# Patient Record
Sex: FEMALE | Race: WHITE | NOT HISPANIC OR LATINO | URBAN - METROPOLITAN AREA
[De-identification: names, ages, dates, MRNs, and addresses within clinical notes are randomized per-mention and may not be internally consistent; named-entity substitution may affect disease eponyms.]

---

## 2023-12-12 ENCOUNTER — LAB (OUTPATIENT)
Dept: LAB | Facility: LAB | Age: 73
End: 2023-12-12
Payer: MEDICARE

## 2023-12-12 DIAGNOSIS — E78.00 PURE HYPERCHOLESTEROLEMIA, UNSPECIFIED: Primary | ICD-10-CM

## 2023-12-12 DIAGNOSIS — I25.10 ATHEROSCLEROTIC HEART DISEASE OF NATIVE CORONARY ARTERY WITHOUT ANGINA PECTORIS: ICD-10-CM

## 2023-12-12 DIAGNOSIS — I10 ESSENTIAL (PRIMARY) HYPERTENSION: ICD-10-CM

## 2023-12-12 DIAGNOSIS — R53.83 OTHER FATIGUE: ICD-10-CM

## 2023-12-12 LAB
ALBUMIN SERPL-MCNC: 4.4 G/DL (ref 3.5–5)
ALP BLD-CCNC: 107 U/L (ref 35–125)
ALT SERPL-CCNC: 10 U/L (ref 5–40)
ANION GAP SERPL CALC-SCNC: 11 MMOL/L
AST SERPL-CCNC: 18 U/L (ref 5–40)
BILIRUB SERPL-MCNC: 0.5 MG/DL (ref 0.1–1.2)
BUN SERPL-MCNC: 10 MG/DL (ref 8–25)
CALCIUM SERPL-MCNC: 9.7 MG/DL (ref 8.5–10.4)
CHLORIDE SERPL-SCNC: 101 MMOL/L (ref 97–107)
CHOLEST SERPL-MCNC: 199 MG/DL (ref 133–200)
CHOLEST/HDLC SERPL: 4.2 {RATIO}
CO2 SERPL-SCNC: 27 MMOL/L (ref 24–31)
CREAT SERPL-MCNC: 0.7 MG/DL (ref 0.4–1.6)
ERYTHROCYTE [DISTWIDTH] IN BLOOD BY AUTOMATED COUNT: 13.1 % (ref 11.5–14.5)
GFR SERPL CREATININE-BSD FRML MDRD: >90 ML/MIN/1.73M*2
GLUCOSE SERPL-MCNC: 88 MG/DL (ref 65–99)
HCT VFR BLD AUTO: 42.3 % (ref 36–46)
HDLC SERPL-MCNC: 47 MG/DL
HGB BLD-MCNC: 13.1 G/DL (ref 12–16)
LDLC SERPL CALC-MCNC: 126 MG/DL (ref 65–130)
MCH RBC QN AUTO: 27 PG (ref 26–34)
MCHC RBC AUTO-ENTMCNC: 31 G/DL (ref 32–36)
MCV RBC AUTO: 87 FL (ref 80–100)
NRBC BLD-RTO: 0 /100 WBCS (ref 0–0)
PLATELET # BLD AUTO: 274 X10*3/UL (ref 150–450)
POTASSIUM SERPL-SCNC: 4.2 MMOL/L (ref 3.4–5.1)
PROT SERPL-MCNC: 6.9 G/DL (ref 5.9–7.9)
RBC # BLD AUTO: 4.85 X10*6/UL (ref 4–5.2)
SODIUM SERPL-SCNC: 139 MMOL/L (ref 133–145)
TRIGL SERPL-MCNC: 132 MG/DL (ref 40–150)
TSH SERPL DL<=0.05 MIU/L-ACNC: 3.5 MIU/L (ref 0.27–4.2)
WBC # BLD AUTO: 5.7 X10*3/UL (ref 4.4–11.3)

## 2023-12-12 PROCEDURE — 85027 COMPLETE CBC AUTOMATED: CPT

## 2023-12-12 PROCEDURE — 36415 COLL VENOUS BLD VENIPUNCTURE: CPT

## 2023-12-12 PROCEDURE — 84443 ASSAY THYROID STIM HORMONE: CPT

## 2023-12-12 PROCEDURE — 80053 COMPREHEN METABOLIC PANEL: CPT

## 2023-12-12 PROCEDURE — 80061 LIPID PANEL: CPT

## 2024-06-16 ENCOUNTER — HOSPITAL ENCOUNTER (EMERGENCY)
Facility: HOSPITAL | Age: 74
Discharge: HOME | End: 2024-06-16
Payer: MEDICARE

## 2024-06-16 ENCOUNTER — APPOINTMENT (OUTPATIENT)
Dept: RADIOLOGY | Facility: HOSPITAL | Age: 74
End: 2024-06-16
Payer: MEDICARE

## 2024-06-16 VITALS
OXYGEN SATURATION: 100 % | TEMPERATURE: 98.4 F | WEIGHT: 180 LBS | DIASTOLIC BLOOD PRESSURE: 80 MMHG | SYSTOLIC BLOOD PRESSURE: 163 MMHG | HEIGHT: 66 IN | HEART RATE: 80 BPM | BODY MASS INDEX: 28.93 KG/M2 | RESPIRATION RATE: 18 BRPM

## 2024-06-16 DIAGNOSIS — S62.114A NONDISPLACED FRACTURE OF TRIQUETRUM (CUNEIFORM) BONE, RIGHT WRIST, INITIAL ENCOUNTER FOR CLOSED FRACTURE: Primary | ICD-10-CM

## 2024-06-16 PROCEDURE — 73110 X-RAY EXAM OF WRIST: CPT | Mod: RIGHT SIDE | Performed by: RADIOLOGY

## 2024-06-16 PROCEDURE — 72125 CT NECK SPINE W/O DYE: CPT

## 2024-06-16 PROCEDURE — 29125 APPL SHORT ARM SPLINT STATIC: CPT | Mod: RT

## 2024-06-16 PROCEDURE — 99285 EMERGENCY DEPT VISIT HI MDM: CPT | Mod: 25

## 2024-06-16 PROCEDURE — 73110 X-RAY EXAM OF WRIST: CPT | Mod: RT

## 2024-06-16 PROCEDURE — 72125 CT NECK SPINE W/O DYE: CPT | Performed by: STUDENT IN AN ORGANIZED HEALTH CARE EDUCATION/TRAINING PROGRAM

## 2024-06-16 PROCEDURE — 70450 CT HEAD/BRAIN W/O DYE: CPT

## 2024-06-16 PROCEDURE — 70450 CT HEAD/BRAIN W/O DYE: CPT | Performed by: STUDENT IN AN ORGANIZED HEALTH CARE EDUCATION/TRAINING PROGRAM

## 2024-06-16 ASSESSMENT — LIFESTYLE VARIABLES
EVER FELT BAD OR GUILTY ABOUT YOUR DRINKING: NO
HAVE PEOPLE ANNOYED YOU BY CRITICIZING YOUR DRINKING: NO
TOTAL SCORE: 0
HAVE YOU EVER FELT YOU SHOULD CUT DOWN ON YOUR DRINKING: NO
EVER HAD A DRINK FIRST THING IN THE MORNING TO STEADY YOUR NERVES TO GET RID OF A HANGOVER: NO

## 2024-06-16 ASSESSMENT — COLUMBIA-SUICIDE SEVERITY RATING SCALE - C-SSRS
1. IN THE PAST MONTH, HAVE YOU WISHED YOU WERE DEAD OR WISHED YOU COULD GO TO SLEEP AND NOT WAKE UP?: NO
2. HAVE YOU ACTUALLY HAD ANY THOUGHTS OF KILLING YOURSELF?: NO
6. HAVE YOU EVER DONE ANYTHING, STARTED TO DO ANYTHING, OR PREPARED TO DO ANYTHING TO END YOUR LIFE?: NO

## 2024-06-16 ASSESSMENT — PAIN - FUNCTIONAL ASSESSMENT: PAIN_FUNCTIONAL_ASSESSMENT: 0-10

## 2024-06-16 ASSESSMENT — PAIN SCALES - GENERAL: PAINLEVEL_OUTOF10: 3

## 2024-06-16 ASSESSMENT — PAIN DESCRIPTION - LOCATION: LOCATION: ARM

## 2024-06-16 NOTE — ED PROVIDER NOTES
HPI   Chief Complaint   Patient presents with    Fall    Wrist Pain     Tripped and fell on her patio. Right wrist injury. Pt hit her face. Has small scrapes to the left eyebrow and cheek. Scraped her glasses as well. Pt reports no LOC or BT medications.       HPI  See my MDM                  Sherice Coma Scale Score: 15                     Patient History   No past medical history on file.  No past surgical history on file.  No family history on file.  Social History     Tobacco Use    Smoking status: Not on file    Smokeless tobacco: Not on file   Substance Use Topics    Alcohol use: Not on file    Drug use: Not on file       Physical Exam   ED Triage Vitals [06/16/24 1525]   Temperature Heart Rate Respirations BP   36.9 °C (98.4 °F) 84 18 (!) 172/100      Pulse Ox Temp Source Heart Rate Source Patient Position   94 % Tympanic -- Sitting      BP Location FiO2 (%)     Left arm --       Physical Exam  CONSTITUTIONAL: Vital signs reviewed as charted, well-developed and in no distress  Eyes: Extraocular muscles are intact. Pupils equal round and reactive to light. Conjunctiva are pink.    ENT: Mucous membranes are moist. Tongue in the midline. Pharynx was without erythema or exudates, uvula midline  LUNGS: Breath sounds equal and clear to auscultation. Good air exchange, no wheezes rales or retractions, pulse oximetry is charted.  HEART: Regular rate and rhythm without murmur thrill or rub, strong tones, auscultation is normal.  ABDOMEN: Soft and nontender without guarding rebound rigidity or mass. Bowel sounds are present and normal in all quadrants. There is no palpable masses or aneurysms identified. No hepatosplenomegaly, normal abdominal exam.  Neuro: The patient is awake, alert and oriented ×3. Moving all 4 extremities and answering questions appropriately.   MUSCULOSKELETAL: Examination of the right wrist does show some tenderness atThe distal radius.  There is no obvious deformity.  Motor sensation pulses are  "intact.  PSYCH: Awake alert oriented, normal mood and affect.  Skin:  Dry, normal color, warm to the touch, no rash present.  Scratch present on the right cheek, mild ecchymosis over the right upper lateral orbit full ocular range of motion without tenderness.    ED Course & MDM   Diagnoses as of 06/16/24 1801   Nondisplaced fracture of triquetrum (cuneiform) bone, right wrist, initial encounter for closed fracture       Medical Decision Making  History obtained from: patient    Vital signs, nursing notes, current medications, past medical history, Surgical history, allergies, social history, family History were reviewed.         HPI:  Patient is a 73-year-old female present emergency room today after tripping and falling on her patio.  Did strike her head on the ground.  Has small hematoma and abrasion over the superior lateral orbit.  Abrasion on her right maxillary area.  No loss conscious.  Does not take anticoagulants.  Denies dizziness, chest pain, shortness of breath, abdominal pain extremity edema.  Nontoxic well-appearing      10 point ROS was reviewed and negative except Noted above in HPI.  DDX: as listed above          MDM Summary/considerations:  EMERGENCY DEPARTMENT COURSE and DIFFERENTIAL DIAGNOSIS/MDM:    The patient presented with a chief complaint of injury of head. The differential diagnosis associated with this patient's presentation includes contusion, fracture, sprain or strain,.  Intracranial hemorrhage    Vitals:    Vitals:    06/16/24 1525   BP: (!) 172/100   BP Location: Left arm   Patient Position: Sitting   Pulse: 84   Resp: 18   Temp: 36.9 °C (98.4 °F)   TempSrc: Tympanic   SpO2: 94%   Weight: 81.6 kg (180 lb)   Height: 1.676 m (5' 6\")       Diagnoses as of 06/16/24 1801   Nondisplaced fracture of triquetrum (cuneiform) bone, right wrist, initial encounter for closed fracture       History Limited by:    None    Independent history obtained from:    None    External records " reviewed:    None    Diagnostics interpreted by me:    Xray(s) right wrist  and CT Scan(s) head and neck    Discussions with other clinicians:    None    Chronic conditions impacting care:    None    Social determinants of health affecting care:    None    Diagnostic tests considered but not performed: none    ED Medications managed:    Medications - No data to display    Prescription drugs considered:    None    Screenings:          Labs Reviewed - No data to display  CT cervical spine wo IV contrast   Final Result   CT HEAD:   1. No evidence of hemorrhage, skull fracture, or other acute   intracranial trauma.   2. Subtle attenuation changes present in the periventricular and   subcortical white matter of bilateral cerebral hemispheres likely   represent sequela of microvascular disease.        CT C-SPINE:   1. No evidence of acute trauma to the cervical spine.   2. Mild-to-moderate spinal canal narrowing is suspected at the level   of C6-C7 due to disc osteophyte complex and ligamentum flavum   thickening.        MACRO:   None        Signed by: Faisal Logan 6/16/2024 5:13 PM   Dictation workstation:   [id number]      CT head wo IV contrast   Final Result   CT HEAD:   1. No evidence of hemorrhage, skull fracture, or other acute   intracranial trauma.   2. Subtle attenuation changes present in the periventricular and   subcortical white matter of bilateral cerebral hemispheres likely   represent sequela of microvascular disease.        CT C-SPINE:   1. No evidence of acute trauma to the cervical spine.   2. Mild-to-moderate spinal canal narrowing is suspected at the level   of C6-C7 due to disc osteophyte complex and ligamentum flavum   thickening.        MACRO:   None        Signed by: Faisal Logan 6/16/2024 5:13 PM   Dictation workstation:   [id number]      XR wrist right 3+ views   Final Result   Equivocal nondisplaced dorsal triquetral fracture on the lateral   radiograph. Correlate with  point tenderness.        Moderate 1st carpometacarpal and triscaphe osteoarthritis.        Signed by: Austni Foster 6/16/2024 4:25 PM   Dictation workstation:   [id number]        Medications - No data to display  New Prescriptions    No medications on file     I estimate there is LOW risk for COMPARTMENT SYNDROME, DEEP VENOUS THROMBOSIS, SEPTIC ARTHRITIS, TENDON OR NEUROVASCULAR INJURY, thus I consider the discharge disposition reasonable. We have discussed the diagnosis and risks, and we agree with discharging home to follow-up with their primary doctor or the referral orthopedist. We also discussed returning to the Emergency Department immediately if new or worsening symptoms occur. We have discussed the symptoms which aremost concerning (e.g., changing or worsening pain, numbness, weakness) that necessitates immediate return.    X-ray of the wrist does show a nondisplaced triquetrum fracture patient will be placed into a short arm splint.  Splint was done by nursing staff.  Will follow with orthopedics 1 to 2 days for reevaluation.  Was discharged home stable condition.  CT scan of the head and neck were negative for acute process.        I was present for splint application. Post splint application, the patient was neurologically intact, motor and sensation were intact. Cap refill is less than 3 seconds.        All of the patient's questions were answered to the best of my ability.  Patient states understanding that they have been screened for an emergency today and we have not found any etiology of symptoms that requires emergent treatment or admission to the hospital at this point. They understand that they have not had definitive care day and require follow-up for treatment of their condition. They also state understanding that they may have an emergent condition that may potentially have not of detected at this visit and they must return to the emergency department if they develop any worsening of symptoms  or new complaints.      I have evaluated this patient, my supervising physician was available for consultation.            Critical Care: Not warranted at this time        This chart was completed using voice recognition transcription software. Please excuse any errors of transcription including grammatical, punctuation, syntax and spelling errors.  Please contact me with any questions regarding this chart.    Procedure  Procedures     VIJAY Chapa-VICTORIA  06/16/24 0510

## 2024-08-28 ENCOUNTER — HOSPITAL ENCOUNTER (EMERGENCY)
Facility: HOSPITAL | Age: 74
Discharge: HOME | End: 2024-08-28
Attending: EMERGENCY MEDICINE
Payer: MEDICARE

## 2024-08-28 ENCOUNTER — LAB (OUTPATIENT)
Dept: LAB | Facility: LAB | Age: 74
End: 2024-08-28
Payer: MEDICARE

## 2024-08-28 ENCOUNTER — APPOINTMENT (OUTPATIENT)
Dept: CARDIOLOGY | Facility: HOSPITAL | Age: 74
End: 2024-08-28
Payer: MEDICARE

## 2024-08-28 ENCOUNTER — APPOINTMENT (OUTPATIENT)
Dept: RADIOLOGY | Facility: HOSPITAL | Age: 74
End: 2024-08-28
Payer: MEDICARE

## 2024-08-28 VITALS
TEMPERATURE: 97.9 F | WEIGHT: 176 LBS | SYSTOLIC BLOOD PRESSURE: 140 MMHG | BODY MASS INDEX: 29.32 KG/M2 | DIASTOLIC BLOOD PRESSURE: 68 MMHG | HEART RATE: 73 BPM | HEIGHT: 65 IN | RESPIRATION RATE: 16 BRPM | OXYGEN SATURATION: 98 %

## 2024-08-28 DIAGNOSIS — R53.83 OTHER FATIGUE: ICD-10-CM

## 2024-08-28 DIAGNOSIS — E78.00 PURE HYPERCHOLESTEROLEMIA, UNSPECIFIED: ICD-10-CM

## 2024-08-28 DIAGNOSIS — R50.9 FEVER, UNSPECIFIED FEVER CAUSE: ICD-10-CM

## 2024-08-28 DIAGNOSIS — I10 ESSENTIAL (PRIMARY) HYPERTENSION: ICD-10-CM

## 2024-08-28 DIAGNOSIS — R06.02 SHORTNESS OF BREATH: Primary | ICD-10-CM

## 2024-08-28 DIAGNOSIS — U07.1 COVID-19: ICD-10-CM

## 2024-08-28 DIAGNOSIS — I25.10 ATHEROSCLEROTIC HEART DISEASE OF NATIVE CORONARY ARTERY WITHOUT ANGINA PECTORIS: ICD-10-CM

## 2024-08-28 LAB
ALBUMIN SERPL-MCNC: 4.2 G/DL (ref 3.5–5)
ALP BLD-CCNC: 96 U/L (ref 35–125)
ALT SERPL-CCNC: 14 U/L (ref 5–40)
ANION GAP SERPL CALC-SCNC: 14 MMOL/L
ANION GAP SERPL CALC-SCNC: >19 MMOL/L
APPEARANCE UR: CLEAR
AST SERPL-CCNC: 25 U/L (ref 5–40)
BASOPHILS # BLD AUTO: 0.02 X10*3/UL (ref 0–0.1)
BASOPHILS NFR BLD AUTO: 0.5 %
BILIRUB SERPL-MCNC: 0.3 MG/DL (ref 0.1–1.2)
BILIRUB UR STRIP.AUTO-MCNC: NEGATIVE MG/DL
BUN SERPL-MCNC: 26 MG/DL (ref 8–25)
BUN SERPL-MCNC: 29 MG/DL (ref 8–25)
CALCIUM SERPL-MCNC: 9.4 MG/DL (ref 8.5–10.4)
CALCIUM SERPL-MCNC: 9.5 MG/DL (ref 8.5–10.4)
CHLORIDE SERPL-SCNC: 100 MMOL/L (ref 97–107)
CHLORIDE SERPL-SCNC: 101 MMOL/L (ref 97–107)
CO2 SERPL-SCNC: 20 MMOL/L (ref 24–31)
CO2 SERPL-SCNC: 24 MMOL/L (ref 24–31)
COLOR UR: ABNORMAL
CREAT SERPL-MCNC: 1.2 MG/DL (ref 0.4–1.6)
CREAT SERPL-MCNC: 1.2 MG/DL (ref 0.4–1.6)
EGFRCR SERPLBLD CKD-EPI 2021: 48 ML/MIN/1.73M*2
EGFRCR SERPLBLD CKD-EPI 2021: 48 ML/MIN/1.73M*2
EOSINOPHIL # BLD AUTO: 0.09 X10*3/UL (ref 0–0.4)
EOSINOPHIL NFR BLD AUTO: 2.4 %
ERYTHROCYTE [DISTWIDTH] IN BLOOD BY AUTOMATED COUNT: 13.1 % (ref 11.5–14.5)
ERYTHROCYTE [DISTWIDTH] IN BLOOD BY AUTOMATED COUNT: 13.2 % (ref 11.5–14.5)
EST. AVERAGE GLUCOSE BLD GHB EST-MCNC: 103 MG/DL
GLUCOSE SERPL-MCNC: 100 MG/DL (ref 65–99)
GLUCOSE SERPL-MCNC: 108 MG/DL (ref 65–99)
GLUCOSE UR STRIP.AUTO-MCNC: NORMAL MG/DL
HBA1C MFR BLD: 5.2 %
HCT VFR BLD AUTO: 39.9 % (ref 36–46)
HCT VFR BLD AUTO: 40.5 % (ref 36–46)
HGB BLD-MCNC: 12.8 G/DL (ref 12–16)
HGB BLD-MCNC: 13.2 G/DL (ref 12–16)
IMM GRANULOCYTES # BLD AUTO: 0.01 X10*3/UL (ref 0–0.5)
IMM GRANULOCYTES NFR BLD AUTO: 0.3 % (ref 0–0.9)
KETONES UR STRIP.AUTO-MCNC: NEGATIVE MG/DL
LACTATE BLDV-SCNC: 1.1 MMOL/L (ref 0.4–2)
LEUKOCYTE ESTERASE UR QL STRIP.AUTO: ABNORMAL
LYMPHOCYTES # BLD AUTO: 1.85 X10*3/UL (ref 0.8–3)
LYMPHOCYTES NFR BLD AUTO: 49.9 %
MCH RBC QN AUTO: 27.4 PG (ref 26–34)
MCH RBC QN AUTO: 28.1 PG (ref 26–34)
MCHC RBC AUTO-ENTMCNC: 31.6 G/DL (ref 32–36)
MCHC RBC AUTO-ENTMCNC: 33.1 G/DL (ref 32–36)
MCV RBC AUTO: 85 FL (ref 80–100)
MCV RBC AUTO: 87 FL (ref 80–100)
MONOCYTES # BLD AUTO: 0.28 X10*3/UL (ref 0.05–0.8)
MONOCYTES NFR BLD AUTO: 7.5 %
MUCOUS THREADS #/AREA URNS AUTO: NORMAL /LPF
NEUTROPHILS # BLD AUTO: 1.46 X10*3/UL (ref 1.6–5.5)
NEUTROPHILS NFR BLD AUTO: 39.4 %
NITRITE UR QL STRIP.AUTO: NEGATIVE
NRBC BLD-RTO: 0 /100 WBCS (ref 0–0)
NRBC BLD-RTO: 0 /100 WBCS (ref 0–0)
NT-PROBNP SERPL-MCNC: 56 PG/ML (ref 0–353)
PH UR STRIP.AUTO: 5 [PH]
PLATELET # BLD AUTO: 244 X10*3/UL (ref 150–450)
PLATELET # BLD AUTO: 252 X10*3/UL (ref 150–450)
POTASSIUM SERPL-SCNC: 3.5 MMOL/L (ref 3.4–5.1)
POTASSIUM SERPL-SCNC: 4 MMOL/L (ref 3.4–5.1)
PROT SERPL-MCNC: 7.6 G/DL (ref 5.9–7.9)
PROT UR STRIP.AUTO-MCNC: NEGATIVE MG/DL
RBC # BLD AUTO: 4.68 X10*6/UL (ref 4–5.2)
RBC # BLD AUTO: 4.69 X10*6/UL (ref 4–5.2)
RBC # UR STRIP.AUTO: NEGATIVE /UL
RBC #/AREA URNS AUTO: NORMAL /HPF
SARS-COV-2 RNA RESP QL NAA+PROBE: DETECTED
SODIUM SERPL-SCNC: 139 MMOL/L (ref 133–145)
SODIUM SERPL-SCNC: 140 MMOL/L (ref 133–145)
SP GR UR STRIP.AUTO: 1.01
SQUAMOUS #/AREA URNS AUTO: NORMAL /HPF
TROPONIN T SERPL-MCNC: 6 NG/L
TROPONIN T SERPL-MCNC: 8 NG/L
UROBILINOGEN UR STRIP.AUTO-MCNC: NORMAL MG/DL
WBC # BLD AUTO: 3.7 X10*3/UL (ref 4.4–11.3)
WBC # BLD AUTO: 4.2 X10*3/UL (ref 4.4–11.3)
WBC #/AREA URNS AUTO: NORMAL /HPF

## 2024-08-28 PROCEDURE — 83605 ASSAY OF LACTIC ACID: CPT | Performed by: EMERGENCY MEDICINE

## 2024-08-28 PROCEDURE — 93005 ELECTROCARDIOGRAM TRACING: CPT

## 2024-08-28 PROCEDURE — 96360 HYDRATION IV INFUSION INIT: CPT

## 2024-08-28 PROCEDURE — 2500000004 HC RX 250 GENERAL PHARMACY W/ HCPCS (ALT 636 FOR OP/ED): Performed by: EMERGENCY MEDICINE

## 2024-08-28 PROCEDURE — 83880 ASSAY OF NATRIURETIC PEPTIDE: CPT | Performed by: EMERGENCY MEDICINE

## 2024-08-28 PROCEDURE — 84484 ASSAY OF TROPONIN QUANT: CPT | Performed by: EMERGENCY MEDICINE

## 2024-08-28 PROCEDURE — 81001 URINALYSIS AUTO W/SCOPE: CPT | Performed by: EMERGENCY MEDICINE

## 2024-08-28 PROCEDURE — 99283 EMERGENCY DEPT VISIT LOW MDM: CPT | Mod: 25

## 2024-08-28 PROCEDURE — 71045 X-RAY EXAM CHEST 1 VIEW: CPT | Performed by: STUDENT IN AN ORGANIZED HEALTH CARE EDUCATION/TRAINING PROGRAM

## 2024-08-28 PROCEDURE — 87635 SARS-COV-2 COVID-19 AMP PRB: CPT | Performed by: EMERGENCY MEDICINE

## 2024-08-28 PROCEDURE — 36415 COLL VENOUS BLD VENIPUNCTURE: CPT | Performed by: EMERGENCY MEDICINE

## 2024-08-28 PROCEDURE — 84075 ASSAY ALKALINE PHOSPHATASE: CPT | Performed by: EMERGENCY MEDICINE

## 2024-08-28 PROCEDURE — 85025 COMPLETE CBC W/AUTO DIFF WBC: CPT | Performed by: EMERGENCY MEDICINE

## 2024-08-28 PROCEDURE — 87086 URINE CULTURE/COLONY COUNT: CPT | Mod: WESLAB | Performed by: EMERGENCY MEDICINE

## 2024-08-28 PROCEDURE — 71045 X-RAY EXAM CHEST 1 VIEW: CPT

## 2024-08-28 RX ORDER — LOSARTAN POTASSIUM 50 MG/1
100 TABLET ORAL 2 TIMES DAILY
COMMUNITY
Start: 2024-06-26

## 2024-08-28 ASSESSMENT — PAIN DESCRIPTION - PAIN TYPE: TYPE: ACUTE PAIN

## 2024-08-28 ASSESSMENT — PAIN DESCRIPTION - FREQUENCY: FREQUENCY: CONSTANT/CONTINUOUS

## 2024-08-28 ASSESSMENT — PAIN DESCRIPTION - DESCRIPTORS
DESCRIPTORS: ACHING
DESCRIPTORS: OTHER (COMMENT)

## 2024-08-28 ASSESSMENT — PAIN SCALES - GENERAL
PAINLEVEL_OUTOF10: 2
PAINLEVEL_OUTOF10: 4

## 2024-08-28 ASSESSMENT — PAIN DESCRIPTION - LOCATION: LOCATION: CHEST

## 2024-08-28 ASSESSMENT — COLUMBIA-SUICIDE SEVERITY RATING SCALE - C-SSRS
1. IN THE PAST MONTH, HAVE YOU WISHED YOU WERE DEAD OR WISHED YOU COULD GO TO SLEEP AND NOT WAKE UP?: NO
6. HAVE YOU EVER DONE ANYTHING, STARTED TO DO ANYTHING, OR PREPARED TO DO ANYTHING TO END YOUR LIFE?: NO
2. HAVE YOU ACTUALLY HAD ANY THOUGHTS OF KILLING YOURSELF?: NO

## 2024-08-28 ASSESSMENT — PAIN - FUNCTIONAL ASSESSMENT: PAIN_FUNCTIONAL_ASSESSMENT: 0-10

## 2024-08-29 LAB
ATRIAL RATE: 76 BPM
P AXIS: 38 DEGREES
P OFFSET: 177 MS
P ONSET: 132 MS
PR INTERVAL: 190 MS
Q ONSET: 227 MS
QRS COUNT: 13 BEATS
QRS DURATION: 66 MS
QT INTERVAL: 386 MS
QTC CALCULATION(BAZETT): 434 MS
QTC FREDERICIA: 417 MS
R AXIS: -5 DEGREES
T AXIS: 59 DEGREES
T OFFSET: 420 MS
VENTRICULAR RATE: 76 BPM

## 2024-08-29 NOTE — DISCHARGE INSTRUCTIONS
Follow-up with your primary care physician within 1 to 2 days for further management of your current symptoms.      Follow-up with nephrology within 1 week      Return to the emergency department sooner with worsening of symptoms or onset of new symptoms

## 2024-08-29 NOTE — ED PROVIDER NOTES
HPI   Chief Complaint   Patient presents with    Shortness of Breath     Covid positive.  Symptoms started Saturday.  Sent by MD ROTH        Patient History   No past medical history on file.  No past surgical history on file.  No family history on file.  Social History     Tobacco Use    Smoking status: Not on file    Smokeless tobacco: Not on file   Substance Use Topics    Alcohol use: Not on file    Drug use: Not on file       Physical Exam   ED Triage Vitals   Temp Heart Rate Respirations BP   -- 08/28/24 1943 08/28/24 1943 08/28/24 2040    (!) 105 20 140/68      Pulse Ox Temp src Heart Rate Source Patient Position   08/28/24 1943 -- 08/28/24 2040 08/28/24 2040   (!) 93 %  Monitor Sitting      BP Location FiO2 (%)     08/28/24 2040 --     Left arm        Physical Exam      ED Course & MDM   Diagnoses as of 08/28/24 2257   Shortness of breath   COVID-19                 No data recorded     Sherice Coma Scale Score: 15 (08/28/24 1945 : Felisa Connelly RN)                           Medical Decision Making    The patient is a 74-year-old female presenting to the emergency department for evaluation of COVID-19.  The patient reportedly has had some shortness of breath with cough and congestion over the past 5 days.  She has been around other people living had COVID-19 recently.  She states that she initially did not want to take a COVID-19 test but her cardiologist, Dr. Lao, convinced her to take a COVID-19 test.  She states that she did take it and it was positive today.  She states that for the past 5 days she has been having increasing shortness of breath and dyspnea on exertion.  She states that when she walks around the room she feels very short of breath.  She states that she does not have any shortness of breath right now while she is sitting.  She states that she does not have any headache or visual changes.  She states that she does not have any chest pain but she just feels congestion in her  chest.  She denies any abdominal pain.  No nausea or vomiting.  No diarrhea or constipation.  No urinary complaints.  No recent travel.  All pertinent positives and negatives are recorded above.  All other systems reviewed and otherwise negative.  Vital signs within normal limits.  Physical exam with a well-nourished well-developed female in no acute distress.  HEENT exam within normal limits.  She has no evidence of airway compromise or respiratory distress.  Abdominal exam is benign.  She does not have any gross motor, neurologic or vascular deficits on exam.  Pulses are equal bilaterally.      EKG with normal sinus rhythm at 76 bpm, normal axis, normal voltage, normal ST segment, and a slight flattening of the T waves in the inferior and lateral leads      IV fluids ordered.      Diagnostic labs with mild leukopenia and positive COVID-19 test but otherwise unremarkable.      Initial Troponin T 8. Repeat trop T 6      Lactic acid 1.1      XR chest 1 view   Final Result   No acute pulmonary process.        MACRO   None        Signed by: Rick Hinson 8/28/2024 8:26 PM   Dictation workstation:   [id number]         The patient does not have any evidence of ischemia on EKG or cardiac enzymes.  No events on telemetry.  She does not have any evidence of airway compromise or respiratory distress at this time.  Diagnostic labs with evidence of mild leukopenia and positive COVID-19 test but otherwise unremarkable.  She is hemodynamically stable in the emergency room.  Her diagnostic labs do show a GFR of 48 with her previous GFR of greater than 90 in December 2023.  Her cardiologist reportedly wanted her to be seen in the emergency room because of this.  I did discuss this with the hospitalist, Dr Robbins, who recommended that she follow-up with her primary care physician within the next day or 2 for further management of this.  He did not feel the patient warranted admission given a normal creatinine at this time.  We  also did agree to give the patient a referral to nephrology.      The patient was released in good condition.  She will follow-up with her primary care physician within 1 to 2 days for further management of her current symptoms.  She was also given a referral to nephrology.  She was previously prescribed Paxlovid and will continue to take it as prescribed.  She will return to the emergency department sooner with worsening of symptoms or onset of new symptoms.      Impression/diagnosis  COVID-19  Dyspnea on exertion      I independently reviewed the results of the EKG and diagnostic labs      I reviewed the results of the diagnostic labs and diagnostic imaging.  Formal radiology reading was completed by the radiologist    Procedure  Procedures     Ashley Russell MD  08/28/24 2694

## 2024-08-30 LAB — BACTERIA UR CULT: NORMAL
